# Patient Record
Sex: MALE | URBAN - METROPOLITAN AREA
[De-identification: names, ages, dates, MRNs, and addresses within clinical notes are randomized per-mention and may not be internally consistent; named-entity substitution may affect disease eponyms.]

---

## 2018-06-07 ENCOUNTER — POST-OPERATIVE VISIT (OUTPATIENT)
Dept: URBAN - METROPOLITAN AREA CLINIC 23 | Facility: CLINIC | Age: 63
End: 2018-06-07
Payer: COMMERCIAL

## 2018-06-07 PROCEDURE — 99024 POSTOP FOLLOW-UP VISIT: CPT | Performed by: OPTOMETRIST

## 2018-06-07 ASSESSMENT — INTRAOCULAR PRESSURE
OS: 30
OD: 13

## 2018-06-28 ENCOUNTER — SURGERY (OUTPATIENT)
Dept: URBAN - METROPOLITAN AREA SURGERY 11 | Facility: SURGERY | Age: 63
End: 2018-06-28
Payer: COMMERCIAL

## 2018-06-28 PROCEDURE — 66984 XCAPSL CTRC RMVL W/O ECP: CPT | Performed by: OPHTHALMOLOGY

## 2018-06-29 ENCOUNTER — POST-OPERATIVE VISIT (OUTPATIENT)
Dept: URBAN - METROPOLITAN AREA CLINIC 23 | Facility: CLINIC | Age: 63
End: 2018-06-29
Payer: COMMERCIAL

## 2018-06-29 PROCEDURE — 99024 POSTOP FOLLOW-UP VISIT: CPT | Performed by: OPTOMETRIST

## 2018-06-29 ASSESSMENT — INTRAOCULAR PRESSURE
OS: 25
OD: 16

## 2018-07-05 ENCOUNTER — POST-OPERATIVE VISIT (OUTPATIENT)
Dept: URBAN - METROPOLITAN AREA CLINIC 23 | Facility: CLINIC | Age: 63
End: 2018-07-05
Payer: COMMERCIAL

## 2018-07-05 ASSESSMENT — INTRAOCULAR PRESSURE
OD: 15
OS: 19

## 2018-07-25 ENCOUNTER — POST-OPERATIVE VISIT (OUTPATIENT)
Dept: URBAN - METROPOLITAN AREA CLINIC 23 | Facility: CLINIC | Age: 63
End: 2018-07-25
Payer: COMMERCIAL

## 2018-07-25 DIAGNOSIS — Z09 ENCNTR FOR F/U EXAM AFT TRTMT FOR COND OTH THAN MALIG NEOPLM: Primary | ICD-10-CM

## 2018-07-25 PROCEDURE — 99024 POSTOP FOLLOW-UP VISIT: CPT | Performed by: OPTOMETRIST

## 2018-07-25 ASSESSMENT — INTRAOCULAR PRESSURE
OS: 14
OD: 12

## 2019-08-06 ENCOUNTER — OFFICE VISIT (OUTPATIENT)
Dept: URBAN - METROPOLITAN AREA CLINIC 23 | Facility: CLINIC | Age: 64
End: 2019-08-06
Payer: MEDICARE

## 2019-08-06 DIAGNOSIS — E11.9 TYPE 2 DIABETES MELLITUS W/O COMPLICATION: Primary | ICD-10-CM

## 2019-08-06 DIAGNOSIS — H18.413 ARCUS SENILIS, BILATERAL: ICD-10-CM

## 2019-08-06 PROCEDURE — 92014 COMPRE OPH EXAM EST PT 1/>: CPT | Performed by: OPTOMETRIST

## 2019-08-06 ASSESSMENT — INTRAOCULAR PRESSURE
OS: 14
OD: 12

## 2019-08-06 ASSESSMENT — KERATOMETRY
OS: 43.50
OD: 43.75

## 2019-08-06 NOTE — IMPRESSION/PLAN
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 Plan: Discussed findings. Reassured pt of condition. Monitor.

## 2019-08-06 NOTE — IMPRESSION/PLAN
Impression: Type 2 diabetes mellitus w/o complication: D30.6. Plan: Diabetes type II: no background retinopathy, no signs of neovascularization noted. Discussed ocular and systemic benefits of blood sugar control. Stable. Monitor.

## 2020-08-12 ENCOUNTER — OFFICE VISIT (OUTPATIENT)
Dept: URBAN - METROPOLITAN AREA CLINIC 23 | Facility: CLINIC | Age: 65
End: 2020-08-12
Payer: MEDICARE

## 2020-08-12 PROCEDURE — 92014 COMPRE OPH EXAM EST PT 1/>: CPT | Performed by: OPTOMETRIST

## 2020-08-12 ASSESSMENT — INTRAOCULAR PRESSURE
OD: 12
OS: 15

## 2020-08-12 ASSESSMENT — KERATOMETRY
OS: 43.00
OD: 44.13

## 2020-08-12 NOTE — IMPRESSION/PLAN
Impression: Type 2 diabetes mellitus w/o complication: J49.1. Plan: Continue tight glucose control. No retinopathy.

## 2020-08-12 NOTE — IMPRESSION/PLAN
Impression: Anatomical narrow angle, bilateral: H40.033. Plan: PI are still open.  Continue to monitor

## 2021-08-25 ENCOUNTER — OFFICE VISIT (OUTPATIENT)
Dept: URBAN - METROPOLITAN AREA CLINIC 23 | Facility: CLINIC | Age: 66
End: 2021-08-25
Payer: MEDICARE

## 2021-08-25 DIAGNOSIS — H02.831 DERMATOCHALASIS OF RIGHT UPPER EYELID: ICD-10-CM

## 2021-08-25 DIAGNOSIS — H02.834 DERMATOCHALASIS OF LEFT UPPER EYELID: ICD-10-CM

## 2021-08-25 DIAGNOSIS — H40.033 ANATOMICAL NARROW ANGLE, BILATERAL: ICD-10-CM

## 2021-08-25 PROCEDURE — 99213 OFFICE O/P EST LOW 20 MIN: CPT | Performed by: OPTOMETRIST

## 2021-08-25 ASSESSMENT — INTRAOCULAR PRESSURE
OD: 13
OS: 13

## 2021-08-25 ASSESSMENT — KERATOMETRY
OD: 44.00
OS: 43.75

## 2021-08-25 NOTE — IMPRESSION/PLAN
Impression: Dermatochalasis of right upper eyelid: H02.831. Plan: No need for treatment at this time. Will continue to monitor.

## 2021-08-25 NOTE — IMPRESSION/PLAN
Impression: Type 2 diabetes mellitus w/o complication: J77.0. Plan: Discussed Optos with pt. no background diabetic retinopathy. Pt will continue to monitor for any vision changes.

## 2022-08-25 ENCOUNTER — OFFICE VISIT (OUTPATIENT)
Dept: URBAN - METROPOLITAN AREA CLINIC 23 | Facility: CLINIC | Age: 67
End: 2022-08-25
Payer: MEDICARE

## 2022-08-25 DIAGNOSIS — H18.413 ARCUS SENILIS, BILATERAL: ICD-10-CM

## 2022-08-25 DIAGNOSIS — H43.21 CRYSTALLINE DEPOSITS IN VITREOUS BODY, RIGHT EYE: ICD-10-CM

## 2022-08-25 DIAGNOSIS — H40.033 ANATOMICAL NARROW ANGLE, BILATERAL: ICD-10-CM

## 2022-08-25 DIAGNOSIS — E11.9 TYPE 2 DIABETES MELLITUS WITHOUT COMPLICATIONS: Primary | ICD-10-CM

## 2022-08-25 PROCEDURE — 99213 OFFICE O/P EST LOW 20 MIN: CPT | Performed by: OPTOMETRIST

## 2022-08-25 ASSESSMENT — KERATOMETRY
OD: 43.88
OS: 43.88

## 2022-08-25 ASSESSMENT — INTRAOCULAR PRESSURE
OS: 15
OD: 14

## 2022-08-25 NOTE — IMPRESSION/PLAN
Impression: Type 2 diabetes mellitus without complications: E58.1. Bilateral. Condition: established, stable. Plan: Diabetes type II: no background retinopathy, no signs of neovascularization noted. OPTOS photos ordered and reviewed. Discussed ocular and systemic benefits of blood sugar control.

## 2022-08-25 NOTE — IMPRESSION/PLAN
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 Bilateral. Condition: established, stable. Plan: Discussed findings. Discussed normal age-related change. No treatment necessary.

## 2022-08-25 NOTE — IMPRESSION/PLAN
Impression: Anatomical narrow angle, bilateral: H40.033. Bilateral. Condition: resolved. Plan: Discussed findings. PIs open, no further treatment necessary. Monitor.

## 2022-08-25 NOTE — IMPRESSION/PLAN
Impression: Crystalline deposits in vitreous body, right eye: H43.21. Right. Condition: established, stable. Plan: Discussed findings. Asteroid hyalosis noted on OPTOS photos, stable. Monitor yearly.

## 2023-10-16 ENCOUNTER — OFFICE VISIT (OUTPATIENT)
Dept: URBAN - METROPOLITAN AREA CLINIC 23 | Facility: CLINIC | Age: 68
End: 2023-10-16
Payer: MEDICARE

## 2023-10-16 DIAGNOSIS — H40.033 ANATOMICAL NARROW ANGLE, BILATERAL: ICD-10-CM

## 2023-10-16 DIAGNOSIS — R73.03 PREDIABETES: Primary | ICD-10-CM

## 2023-10-16 PROCEDURE — 99213 OFFICE O/P EST LOW 20 MIN: CPT | Performed by: OPTOMETRIST

## 2023-10-16 ASSESSMENT — INTRAOCULAR PRESSURE
OS: 15
OD: 14

## 2024-10-16 ENCOUNTER — OFFICE VISIT (OUTPATIENT)
Dept: URBAN - METROPOLITAN AREA CLINIC 23 | Facility: CLINIC | Age: 69
End: 2024-10-16
Payer: MEDICARE

## 2024-10-16 DIAGNOSIS — E11.9 TYPE 2 DIABETES MELLITUS W/O COMPLICATION: Primary | ICD-10-CM

## 2024-10-16 DIAGNOSIS — H40.033 ANATOMICAL NARROW ANGLE, BILATERAL: ICD-10-CM

## 2024-10-16 DIAGNOSIS — H04.123 DRY EYE SYNDROME OF BILATERAL LACRIMAL GLANDS: ICD-10-CM

## 2024-10-16 DIAGNOSIS — H43.21 CRYSTALLINE DEPOSITS IN VITREOUS BODY, RIGHT EYE: ICD-10-CM

## 2024-10-16 DIAGNOSIS — Z96.1 PRESENCE OF INTRAOCULAR LENS: ICD-10-CM

## 2024-10-16 PROCEDURE — 99214 OFFICE O/P EST MOD 30 MIN: CPT

## 2024-10-16 ASSESSMENT — KERATOMETRY
OD: 43.75
OS: 45.25

## 2024-10-16 ASSESSMENT — INTRAOCULAR PRESSURE
OD: 14
OS: 16